# Patient Record
Sex: MALE | Race: WHITE
[De-identification: names, ages, dates, MRNs, and addresses within clinical notes are randomized per-mention and may not be internally consistent; named-entity substitution may affect disease eponyms.]

---

## 2020-12-13 ENCOUNTER — HOSPITAL ENCOUNTER (EMERGENCY)
Dept: HOSPITAL 75 - ER FS | Age: 65
Discharge: HOME | End: 2020-12-13
Payer: MEDICARE

## 2020-12-13 VITALS — DIASTOLIC BLOOD PRESSURE: 77 MMHG | SYSTOLIC BLOOD PRESSURE: 139 MMHG

## 2020-12-13 VITALS — WEIGHT: 129.85 LBS | BODY MASS INDEX: 19.68 KG/M2 | HEIGHT: 67.99 IN

## 2020-12-13 DIAGNOSIS — R04.2: Primary | ICD-10-CM

## 2020-12-13 DIAGNOSIS — F17.210: ICD-10-CM

## 2020-12-13 DIAGNOSIS — I25.2: ICD-10-CM

## 2020-12-13 PROCEDURE — 99283 EMERGENCY DEPT VISIT LOW MDM: CPT

## 2020-12-13 NOTE — ED COUGH/URI
General


Chief Complaint:  Coughing Up Blood


Stated Complaint:  COUGHING UP BLOOD


Nursing Triage Note:  


Patient reports he has been coughing up bright red blood at least three times 


per day for several days. He states he has a history of COPD and this happens 


every year. He states he is usually placed on antibiotics and the blood in his 


sputum is resolved after several days. He reports he has a pulmonologist at 


[facility] and gets yearly CT scans, but missed his scan this year in




August d/t COVID. Patient actively coughing up moderate amount of bright red 


blood on arrival to ED.


Sepsis Screen:  No Definite Risk


Source:  patient





History of Present Illness


Date Seen by Provider:  Dec 13, 2020


Time Seen by Provider:  18:44


Initial Comments


patient is a 65-year-old male presenting with complaints of cough and 

hemoptysis. He states that this happens usually at least once a year. He has a 

history of COPD and infection in his lungs. He was told that he has a chronic 

infection in his lungs and requires antibiotics periodically. He follows with a 

pulmonary doctor out of [facility]. He started having intermittent 

hemoptysis on Friday but it was worse tonight. He has not had a CT scan of his 

chest this year like he normally does every year. He states that his significant

other had made him come in [facility] to get checked out. She is also trying to get

him an appointment to see Dr. Rayo in the morning. he denies having any fever

or chills. He does not feel any more short of breath than normal. He does have 

some times that he'll cough up regular sputum but today he has had more bloody 

sputum.





Allergies and Home Medications


Allergies


Coded Allergies:  


     No Known Drug Allergies (Unverified , 12/13/20)





Home Medications


Levofloxacin 750 Mg Tablet, 750 MG PO DAILY


   Prescribed by: LEAH MC on 12/13/20 1924





Patient Home Medication List


Home Medication List Reviewed:  Yes





Review of Systems


Review of Systems


Constitutional:  No chills, No dizziness, No fever, No malaise, No weakness


EENTM:  No epistaxis, No nose congestion, No throat pain, No throat swelling


Respiratory:  see HPI, cough, hemoptysis; No orthopnea; short of breath (chronic

and not worse than normal); No stridor; wheezing


Cardiovascular:  No chest pain, No edema, No palpitations


Gastrointestinal:  no symptoms reported; No nausea, No vomiting


Genitourinary:  no symptoms reported


Musculoskeletal:  no symptoms reported


Skin:  no symptoms reported


Psychiatric/Neurological:  Denies Headache, Denies Numbness, Denies Paresthesia,

Denies Weakness


Hematologic/Lymphatic:  Easy Bruising





Past Medical-Social-Family Hx


Past Med/Social Hx:  Reviewed Nursing Past Med/Soc Hx


Patient Social History


Alcohol Use:  Regular Use


Alcohol Beverage of Choice:  Beer


Recreational Drug Use:  No


Smoking Status:  Current Everyday Smoker


Type Used:  Cigarettes


2nd Hand Smoke Exposure:  No


Recent Foreign Travel:  No


Contact w/Someone Who Travel:  No


Recent Infectious Disease Expo:  No


Recent Hopitalizations:  No


Physical Abuse:  No


Sexual Abuse:  No


Mistreated:  No


Fear:  No





Seasonal Allergies


Seasonal Allergies:  No





Past Medical History


Surgeries:  Yes (colon surgery)


Respiratory:  Yes ("something slowly growing in my lungs")


COPD


Cardiac:  Yes


Heart Attack


Neurological:  No


Genitourinary:  No


Gastrointestinal:  No


Musculoskeletal:  No


Endocrine:  No


HEENT:  No


Cancer:  No


Psychosocial:  No


Integumentary:  No





Physical Exam





Vital Signs - First Documented








 12/13/20





 18:06


 


Temp 37.1


 


Pulse 80


 


Resp 18


 


B/P (MAP) 158/80 (106)


 


Pulse Ox 95


 


O2 Delivery Room Air





Capillary Refill : Less Than 3 Seconds


Height: '"


Weight: lbs. oz. kg; 19.00 BMI


Method:


General Appearance:  no apparent distress, thin


HEENT:  PERRL/EOMI


Neck:  non-tender, full range of motion, supple, normal inspection


Respiratory:  chest non-tender, no respiratory distress, no accessory muscle 

use, decreased breath sounds, wheezing (inspiratory and expiratory wheezing)


Cardiovascular:  normal peripheral pulses, regular rate, rhythm


Extremities:  normal range of motion, non-tender, no pedal edema, normal 

capillary refill


Neurologic/Psychiatric:  CNs II-XII nml as tested, no motor/sensory deficits, 

alert, normal mood/affect, oriented x 3


Skin:  normal color, warm/dry





Progress/Results/Core Measures


Suspected Sepsis


Recent Fever Within 48 Hours:  No


Infection Criteria Present:  Suspected New Infection


New/Unexplained  Altered Menta:  No


Sepsis Screen:  No Definite Risk


SIRS


Temperature: 


Pulse: 80 


Respiratory Rate: 18


 


Blood Pressure 158 /80 


Mean: 106





Results/Orders


My Orders





Orders - LEAH MC MD


Levofloxacin  Tablet (Levaquin  Tablet) (12/13/20 19:20)





Vital Signs/I&O











 12/13/20 12/13/20





 18:06 18:21


 


Temp 37.1 


 


Pulse 80 


 


Resp 18 


 


B/P (MAP) 158/80 (106) 


 


Pulse Ox 95 


 


O2 Delivery Room Air Room Air





Capillary Refill : Less Than 3 Seconds








Blood Pressure Mean:                    106








Progress Note :  


Progress Note


advised patient that I could do a CT scan tonight as well as check basic labs 

and give him some IV antibiotics to help treat for possible infection. However 

the patient stated that he was used to coughing up blood was not that worried 

about it. He came in because he had been told previously to get checked if he 

was coughing up blood. His O2 sat is running 95-96% on room air. He is able to 

speak in complete sentences without getting winded. He states he would like to 

discuss antibiotics and follow up with Dr. Rayo in the am. He does not want 

to wait and take the time to do the testing tonight and would want to wait and 

test with Dr. Rayo tomorrow if possible instead. I told him I could not force

him to do the CT and testing tonight. He said he wanted to go get something to 

eat and be with his significant other so he was going to leave but would like 

the antibiotic to get started tonight by pills if possible. 


start with levaquin and discharge with script to the pharmacy. Counseled to 

return or be seen right away if he has worsening breathing problems. Keep appoin

tment in the morning with Dr. Rayo





Departure


Impression





   Primary Impression:  


   Hemoptysis


Disposition:  01 HOME, SELF-CARE


Condition:  Stable





Departure-Patient Inst.


Decision time for Depature:  19:24


Referrals:  


ALISSON RAYO MD (PCP/Family)


Primary Care Physician


Patient Instructions:  Coughing up Blood





Add. Discharge Instructions:  


Take Levaquin for possible infection causing you to be coughing up blood.





If this worsens then check with your lung doctor or return. See Dr. Rayo as 

you plan tomorrow for follow up.





Try using a humidifier at the bedside to help keep your airways moist when you 

are sleeping. This should also cut down on the blood and coughing.





All discharge instructions reviewed with patient and/or family. Voiced 

understanding.


Scripts


Levofloxacin (Levofloxacin) 750 Mg Tablet


750 MG PO DAILY for hemoptysis for 10 Days, #10 TAB 0 Refills


   Prov: LEAH MC MD         12/13/20











LEAH MC MD               Dec 13, 2020 19:24

## 2020-12-17 ENCOUNTER — HOSPITAL ENCOUNTER (OUTPATIENT)
Dept: HOSPITAL 75 - RAD FS | Age: 65
End: 2020-12-17
Attending: FAMILY MEDICINE
Payer: MEDICARE

## 2020-12-17 DIAGNOSIS — J43.2: Primary | ICD-10-CM

## 2020-12-17 LAB
ALBUMIN SERPL-MCNC: 3.9 GM/DL (ref 3.2–4.5)
ALP SERPL-CCNC: 90 U/L (ref 40–136)
ALT SERPL-CCNC: 19 U/L (ref 0–55)
BILIRUB SERPL-MCNC: 0.4 MG/DL (ref 0.1–1)
BUN/CREAT SERPL: 19
CALCIUM SERPL-MCNC: 9.3 MG/DL (ref 8.5–10.1)
CHLORIDE SERPL-SCNC: 97 MMOL/L (ref 98–107)
CO2 SERPL-SCNC: 30 MMOL/L (ref 21–32)
CREAT SERPL-MCNC: 0.67 MG/DL (ref 0.6–1.3)
GFR SERPLBLD BASED ON 1.73 SQ M-ARVRAT: > 60 ML/MIN
GLUCOSE SERPL-MCNC: 96 MG/DL (ref 70–105)
POTASSIUM SERPL-SCNC: 4.1 MMOL/L (ref 3.6–5)
PROT SERPL-MCNC: 6.9 GM/DL (ref 6.4–8.2)
SODIUM SERPL-SCNC: 136 MMOL/L (ref 135–145)

## 2020-12-17 PROCEDURE — 80053 COMPREHEN METABOLIC PANEL: CPT

## 2020-12-17 PROCEDURE — 71260 CT THORAX DX C+: CPT

## 2020-12-17 PROCEDURE — 36415 COLL VENOUS BLD VENIPUNCTURE: CPT

## 2020-12-17 NOTE — DIAGNOSTIC IMAGING REPORT
EXAMINATION: CT Chest with intravenous contrast.



TECHNIQUE: Multiple contiguous axial images were obtained through

the chest after the uneventful administration of intravenous

contrast. All CT scans use one or more of the following dose

optimizing techniques: automated exposure control, MA and/or KvP

adjustment based on a patient size and exam type, or iterative

reconstruction. 



HISTORY: Emphysema, hemoptysis.



COMPARISON: None available.



FINDINGS: 



There is upper lobe predominant emphysema. There is bronchial

wall thickening and cavity formation in the upper lobes. There

are scattered areas of mucus plugging with centrilobular and

tree-in-bud nodules. In the left lower lobe, there is a more

discrete 2.4 x 2.1 cm part solid and groundglass nodule. There

are scattered areas of bronchiectasis as well. No pleural

effusion. No pneumothorax.



There is no axillary or supraclavicular lymphadenopathy. There

are a few mildly enlarged mediastinal lymph nodes measuring up to

11 mm. Heart size is normal. There are no coronary artery

calcifications.  No pericardial effusion. Aorta is normal in

caliber.



Limited views of the upper abdomen show multiple cysts in the

liver.



There are no suspicious osseous lesions.



IMPRESSION:



1. Upper lobe predominant emphysema with areas of cavitation,

scarring, bronchiectasis and bronchial wall thickening. This

findings are suggestive of chronic and bronchial infection

including mycobacterial infections.



2. Superimposed upon this, there are left lower lobe predominant

centrilobular groundglass and tree-in-bud nodules consistent with

an acute superimposed infection. A discrete fairly large part

solid nodule in left lower lobe is favored to be infectious, but

short-term follow-up in four to six weeks is recommended to

ensure this is not a neoplasm.



Dictated by: 



  Dictated on workstation # [id number]

## 2021-04-26 ENCOUNTER — HOSPITAL ENCOUNTER (OUTPATIENT)
Dept: HOSPITAL 75 - RAD FS | Age: 66
End: 2021-04-26
Payer: MEDICARE

## 2021-04-26 DIAGNOSIS — R91.1: ICD-10-CM

## 2021-04-26 DIAGNOSIS — R04.2: Primary | ICD-10-CM

## 2021-04-26 PROCEDURE — 71250 CT THORAX DX C-: CPT

## 2021-04-26 NOTE — DIAGNOSTIC IMAGING REPORT
EXAMINATION: CT chest without contrast.



TECHNIQUE: Multiple contiguous axial images were obtained through

the chest without the use of intravenous contrast. All CT scans

use one or more of the following dose optimizing techniques:

automated exposure control, MA and/or KvP adjustment based on

patient size and exam type or iterative reconstruction. 



HISTORY: Hemoptysis.



COMPARISON: 12/17/2020



FINDINGS: 



Again seen is upper lobe predominant emphysema with scarring,

traction bronchiectasis, mucous plugging and bronchial wall

thickening. There is also involvement of the lingula. There are

tree-in-bud nodules and centrilobular nodules in the left lower

lobe. The suspicious part solid nodule seen on the prior exam is

now cavitated consistent with an infection. The overall size of

the abnormality is 1.9 x 1.3 cm, previously 2.4 x 1.7 cm. No

pleural effusion. No pneumothorax.



There is no axillary or supraclavicular lymphadenopathy. There is

no mediastinal lymphadenopathy. Heart size is normal. There are

mild coronary artery calcifications.  No pericardial effusion.

Aorta is normal in caliber.



Limited views of the upper abdomen show cysts in the liver.



There are no suspicious osseous lesions.



IMPRESSION:



1. The superior segment left lower lobe nodule on the prior exam

is now cavitated and decreased in size consistent with evolving

infectious process. Overall, the appearance of the lungs similar

to prior study and most suggestive of a chronic endobronchial

infection, with particular suspicion for mycobacterial infection.



Dictated by: 



  Dictated on workstation # [id number]

## 2021-07-22 ENCOUNTER — HOSPITAL ENCOUNTER (OUTPATIENT)
Dept: HOSPITAL 75 - PREOP | Age: 66
Discharge: HOME | End: 2021-07-22
Attending: SURGERY
Payer: MEDICARE

## 2021-07-22 VITALS — WEIGHT: 112.44 LBS | HEIGHT: 69.02 IN | BODY MASS INDEX: 16.65 KG/M2

## 2021-07-22 DIAGNOSIS — Z01.818: Primary | ICD-10-CM

## 2021-07-29 ENCOUNTER — HOSPITAL ENCOUNTER (OUTPATIENT)
Dept: HOSPITAL 75 - SDC | Age: 66
Discharge: HOME | End: 2021-07-29
Attending: SURGERY
Payer: MEDICARE

## 2021-07-29 VITALS — HEIGHT: 69.02 IN | WEIGHT: 112.44 LBS | BODY MASS INDEX: 16.65 KG/M2

## 2021-07-29 VITALS — DIASTOLIC BLOOD PRESSURE: 56 MMHG | SYSTOLIC BLOOD PRESSURE: 108 MMHG

## 2021-07-29 VITALS — DIASTOLIC BLOOD PRESSURE: 57 MMHG | SYSTOLIC BLOOD PRESSURE: 109 MMHG

## 2021-07-29 VITALS — SYSTOLIC BLOOD PRESSURE: 120 MMHG | DIASTOLIC BLOOD PRESSURE: 66 MMHG

## 2021-07-29 VITALS — DIASTOLIC BLOOD PRESSURE: 69 MMHG | SYSTOLIC BLOOD PRESSURE: 132 MMHG

## 2021-07-29 VITALS — SYSTOLIC BLOOD PRESSURE: 128 MMHG | DIASTOLIC BLOOD PRESSURE: 69 MMHG

## 2021-07-29 VITALS — DIASTOLIC BLOOD PRESSURE: 64 MMHG | SYSTOLIC BLOOD PRESSURE: 129 MMHG

## 2021-07-29 VITALS — DIASTOLIC BLOOD PRESSURE: 65 MMHG | SYSTOLIC BLOOD PRESSURE: 128 MMHG

## 2021-07-29 VITALS — DIASTOLIC BLOOD PRESSURE: 64 MMHG | SYSTOLIC BLOOD PRESSURE: 110 MMHG

## 2021-07-29 VITALS — SYSTOLIC BLOOD PRESSURE: 129 MMHG | DIASTOLIC BLOOD PRESSURE: 80 MMHG

## 2021-07-29 DIAGNOSIS — J43.9: ICD-10-CM

## 2021-07-29 DIAGNOSIS — Z83.3: ICD-10-CM

## 2021-07-29 DIAGNOSIS — F17.210: ICD-10-CM

## 2021-07-29 DIAGNOSIS — Z82.49: ICD-10-CM

## 2021-07-29 DIAGNOSIS — K40.90: Primary | ICD-10-CM

## 2021-07-29 DIAGNOSIS — Z79.82: ICD-10-CM

## 2021-07-29 DIAGNOSIS — Z80.9: ICD-10-CM

## 2021-07-29 DIAGNOSIS — Z79.899: ICD-10-CM

## 2021-07-29 PROCEDURE — 87081 CULTURE SCREEN ONLY: CPT

## 2021-07-29 PROCEDURE — 88302 TISSUE EXAM BY PATHOLOGIST: CPT

## 2021-07-29 PROCEDURE — 49505 PRP I/HERN INIT REDUC >5 YR: CPT

## 2021-07-29 NOTE — DISCHARGE INST-SIMPLE/STANDARD
Discharge Inst-Standard


Discharge Medications


New, Converted or Re-Newed RX:  Transmitted to Pharmacy





Patient Instructions/Follow Up


Plan of Care/Instructions/FU:  


Anamaria 2-3 weeks.


Activity as Tolerated:  No


Discharge Diet:  Regular Diet


Other Inst to Patient


PROCEDURE:


Open left inguinal hernia repair





COMPLICATIONS:


None.





INDICATIONS:


The patient is a 65, male male with a inguinal hernia.  He understands


risks and benefits of procedure and wished to proceed with procedure.  Consent


was signed in the chart.





DESCRIPTION OF PROCEDURE:


The patient was taken to the operating suite, was prepped and draped in sterile


fashion.  Surgical pause was performed.  Local anesthetic was infiltrated in the


lower quadrant.  Incision was made and cautery used to dissect down to the


external oblique, which was then opened down through the external ring.  The


spermatic cord was then dissected around.  A Penrose drain was placed around it


and there was no direct defect present.  A indirect hernia present


which was then dissected off of spermatic cord.  The hernia sac was then twisted


and suture ligated.  Using ProGrip mesh, this was secured at Paddy's ligament 

and then


incorporated around the spermatic cord and placed under the external oblique. 


Hemostasis was achieved.  The external oblique was then closed recreating the


external ring and then the subcutaneous tissues were then reapproximated using


3-0 Vicryl and skin was then closed using 4-0 Monocryl in a subcuticular


fashion.  The abdomen was then washed and dried and Skin Affix was placed over


the incision.  The patient tolerated procedure well without any complications


and taken to recovery room in stable condition.











JOHN CALZADA DO              Jul 29, 2021 08:48

## 2021-07-29 NOTE — ANESTHESIA-GENERAL POST-OP
General


Patient Condition


Mental Status/LOC:  Same as Preop


Cardiovascular:  Satisfactory


Nausea/Vomiting:  Absent


Respiratory:  Satisfactory


Pain:  Controlled


Complications:  Absent





Post Op Complications


Complications


None





Follow Up Care/Instructions


Patient Instructions


None needed.





Anesthesia/Patient Condition


Patient Condition


Patient is doing well, no complaints, stable vital signs, no apparent adverse 

anesthesia problems.   


No complications reported per nursing.











FAHAD MCBRIDE CRNA         Jul 29, 2021 11:31

## 2021-07-29 NOTE — PROGRESS NOTE-PRE OPERATIVE
Pre-Operative Progress Note


H&P Reviewed


The H&P was reviewed, patient examined and no changes noted.


Date Seen by Provider:  Jul 29, 2021


Time Seen by Provider:  07:55


Date H&P Reviewed:  Jul 29, 2021


Time H&P Reviewed:  07:55


Pre-Operative Diagnosis:  left inguinal hernia











JOHN CALZADA DO              Jul 29, 2021 08:06

## 2021-08-16 NOTE — PROGRESS NOTE-POST OPERATIVE
Post-Operative Progess Note


Surgeon (s)/Assistant (s)


Surgeon


JOHN CALZADA DO


Assistant:  Dr. Amos to assist in retraction dissection and closure.





Pre-Operative Diagnosis


left inguinal hernia





Post-Operative Diagnosis





left indirect inguinal hernia





Procedure & Operative Findings


Date of Procedure


7/29/21


Procedure Performed/Findings


PROCEDURE:


Open left inguinal hernia repair





COMPLICATIONS:


None.





INDICATIONS:


The patient is a 65, male male with a inguinal hernia.  He understands


risks and benefits of procedure and wished to proceed with procedure.  Consent


was signed in the chart.





DESCRIPTION OF PROCEDURE:


The patient was taken to the operating suite, was prepped and draped in sterile


fashion.  Surgical pause was performed.  Local anesthetic was infiltrated in the


lower quadrant.  Incision was made and cautery used to dissect down to the


external oblique, which was then opened down through the external ring.  The


spermatic cord was then dissected around.  A Penrose drain was placed around it


and there was no direct defect present.  A indirect hernia present


which was then dissected off of spermatic cord.  The hernia sac was then twisted


and suture ligated.  Using ProGrip mesh, this was secured at Paddy's ligament 

and then


incorporated around the spermatic cord and placed under the external oblique. 


Hemostasis was achieved.  The external oblique was then closed recreating the


external ring and then the subcutaneous tissues were then reapproximated using


3-0 Vicryl and skin was then closed using 4-0 Monocryl in a subcuticular


fashion.  The abdomen was then washed and dried and Skin Affix was placed over


the incision.  The patient tolerated procedure well without any complications


and taken to recovery room in stable condition.


Anesthesia Type


general





Estimated Blood Loss


Estimated blood loss (mL):  minimal





Specimens/Packing


Specimens Removed


left hernia sac











JOHN CALZADA DO              Aug 16, 2021 10:53

## 2021-09-01 ENCOUNTER — HOSPITAL ENCOUNTER (OUTPATIENT)
Dept: HOSPITAL 75 - RAD FS | Age: 66
End: 2021-09-01
Attending: INTERNAL MEDICINE
Payer: MEDICARE

## 2021-09-01 DIAGNOSIS — R91.8: Primary | ICD-10-CM

## 2021-09-01 DIAGNOSIS — F17.210: ICD-10-CM

## 2021-09-01 PROCEDURE — 71250 CT THORAX DX C-: CPT

## 2021-09-01 NOTE — DIAGNOSTIC IMAGING REPORT
EXAMINATION: CT chest without contrast.



TECHNIQUE: Multiple contiguous axial images were obtained through

the chest without the use of intravenous contrast. All CT scans

use one or more of the following dose optimizing techniques:

automated exposure control, MA and/or KvP adjustment based on

patient size and exam type or iterative reconstruction. 



HISTORY: Pulmonary nodule.



COMPARISON: 04/26/2021



FINDINGS: 



There is moderate upper lobe predominant emphysema. There is

multifocal mucous plugging and bronchial dilation and bronchial

wall thickening. There are tree-in-bud nodules in the left upper

lobe and right upper lobe. Cavitary nodule in the superior

segment of left lower lobe is unchanged. There is a large area of

cavitation in the apex of the left lung. There is scarring in the

lateral portion of the right apex is unchanged. There is a new

2.8 x 1.9 cm nodule in the lingula. No pleural effusion. No

pneumothorax.



There is no axillary or supraclavicular lymphadenopathy. There is

no mediastinal lymphadenopathy. Heart size is normal. There are

mild coronary artery calcifications.  No pericardial effusion.

Aorta is normal in caliber.



Limited views of the upper abdomen show cysts in the liver.



There are no suspicious osseous lesions.



IMPRESSION:



1. Persistent bronchial wall thickening, mucous plugging,

tree-in-bud nodules, bronchial dilation with multiple cavitary

nodules. There is a new 2.8 x 1.9 cm lingular nodule. Overall

findings are favored to represent chronic endobronchial infection

including mycobacterial infections. Six week follow-up is

recommended for the lingular nodule.



Dictated by: 



  Dictated on workstation # [id number]

## 2021-10-11 ENCOUNTER — HOSPITAL ENCOUNTER (OUTPATIENT)
Dept: HOSPITAL 75 - RAD FS | Age: 66
End: 2021-10-11
Attending: INTERNAL MEDICINE
Payer: MEDICARE

## 2021-10-11 DIAGNOSIS — F17.210: ICD-10-CM

## 2021-10-11 DIAGNOSIS — R91.8: Primary | ICD-10-CM

## 2021-10-11 DIAGNOSIS — J85.2: ICD-10-CM

## 2021-10-11 PROCEDURE — 71250 CT THORAX DX C-: CPT

## 2021-10-11 NOTE — DIAGNOSTIC IMAGING REPORT
EXAMINATION: CT chest without contrast.



TECHNIQUE: Multiple contiguous axial images were obtained through

the chest without the use of intravenous contrast. All CT scans

use one or more of the following dose optimizing techniques:

automated exposure control, MA and/or KvP adjustment based on

patient size and exam type or iterative reconstruction. 



HISTORY: Pulmonary nodule



COMPARISON: 09/01/2021



FINDINGS: 



There is unchanged bronchial wall thickening and mucous plugging

and tree-in-bud nodules most pronounced in the lingula. There are

areas of cavitation and scarring in both upper lobes. Lungs are

severely emphysematous. The discrete lingular nodule measures 2.2

x 1.5 cm previously 2.8 x 1.9 cm. No new nodules are seen. No

pleural effusion. No pneumothorax.



There is no axillary or supraclavicular lymphadenopathy. There is

no mediastinal lymphadenopathy. Heart size is normal. There are

mild coronary artery calcifications.  No pericardial effusion.

Aorta is normal in caliber.



Limited views of the upper abdomen show a cyst in the liver.



There are no suspicious osseus lesions.



IMPRESSION:



1. Persistent bronchial wall thickening, tree-in-bud nodules and

mucus plugging suggestive of chronic endobronchial infection

including mycobacterial infections. The most discrete lingular

nodule is decreased in size. Continued imaging followup is

warranted but this is all favored to be due to a chronic

infection.



Dictated by: 



  Dictated on workstation # [id number]

## 2022-10-12 ENCOUNTER — HOSPITAL ENCOUNTER (OUTPATIENT)
Dept: HOSPITAL 75 - RAD FS | Age: 67
End: 2022-10-12
Attending: INTERNAL MEDICINE
Payer: MEDICARE

## 2022-10-12 DIAGNOSIS — I77.811: ICD-10-CM

## 2022-10-12 DIAGNOSIS — R91.8: Primary | ICD-10-CM

## 2022-10-12 PROCEDURE — 71250 CT THORAX DX C-: CPT

## 2022-10-12 NOTE — DIAGNOSTIC IMAGING REPORT
PROCEDURE: 

CT chest without contrast.



TECHNIQUE: 

Multiple contiguous axial images were obtained through the chest

without the use of intravenous contrast. Auto Exposure Controls

were utilized during the CT exam to meet ALARA standards for

radiation dose reduction. 



INDICATION:  

R91.8, pulmonary nodules.



COMPARISON: 

10/11/2021 and 12/17/2020.



FINDINGS: 

No significant adenopathy within the chest. Mild scattered

vascular calcifications without aneurysmal dilatation of the

thoracic aorta. The heart is within normal limits in size. No

significant pericardial effusion. No pleural effusion. No

pneumothorax. Severe background emphysematous changes,

particularly within the upper lobes. Persistent areas of scarring

and cavitation are again noted within the upper lobes. New

tree-in-bud nodularity is noted within the medial aspect of the

left lower lobe with the most focal pulmonary nodule within this

location measuring up to 0.8 cm, series 5, image 81. The

previously noted lingular pulmonary nodule has decreased in size

since the prior examination measuring up to 1.6 cm, previously

measuring up to 2.4 cm. Additional areas of bronchial wall

thickening, mucous plugging, and tree-in-bud nodules are again

identified appearing similar to the prior examination. Hepatic

cysts. Ectasia with advanced calcifications within the abdominal

aorta measuring up to 2.5 cm. No acute osseous abnormality.



IMPRESSION: 

New tree-in-bud nodularity with a focal nodule measuring up to

0.8 cm. Given appearance, this is felt to most likely relate to

an endobronchial infectious or inflammatory process. Followup CT

in 3 months is recommended to reevaluate as malignancy is not

excluded.



Additional stable bronchial wall thickening, tree-in-bud

nodularity, and mucous plugging which suggest an underlying

chronic endobronchial infection such as a mycobacterial

infection. This is associated with prominent cavitation within

the upper lungs.



Ectasia of the abdominal aorta with associated advanced vascular

calcifications.



Dictated by: 



  Dictated on workstation # [id number]

## 2022-12-06 ENCOUNTER — HOSPITAL ENCOUNTER (OUTPATIENT)
Dept: HOSPITAL 75 - PREOP | Age: 67
LOS: 2 days | Discharge: HOME | End: 2022-12-08
Attending: SURGERY
Payer: MEDICARE

## 2022-12-06 VITALS — WEIGHT: 108.03 LBS | HEIGHT: 69.02 IN | BODY MASS INDEX: 16 KG/M2

## 2022-12-06 DIAGNOSIS — Z01.818: Primary | ICD-10-CM

## 2022-12-13 ENCOUNTER — HOSPITAL ENCOUNTER (OUTPATIENT)
Dept: HOSPITAL 75 - ENDO | Age: 67
Discharge: HOME | End: 2022-12-13
Attending: SURGERY
Payer: MEDICARE

## 2022-12-13 VITALS — SYSTOLIC BLOOD PRESSURE: 146 MMHG | DIASTOLIC BLOOD PRESSURE: 77 MMHG

## 2022-12-13 VITALS — WEIGHT: 108.03 LBS | HEIGHT: 69.02 IN | BODY MASS INDEX: 16 KG/M2

## 2022-12-13 VITALS — DIASTOLIC BLOOD PRESSURE: 50 MMHG | SYSTOLIC BLOOD PRESSURE: 93 MMHG

## 2022-12-13 VITALS — DIASTOLIC BLOOD PRESSURE: 77 MMHG | SYSTOLIC BLOOD PRESSURE: 134 MMHG

## 2022-12-13 VITALS — SYSTOLIC BLOOD PRESSURE: 101 MMHG | DIASTOLIC BLOOD PRESSURE: 59 MMHG

## 2022-12-13 VITALS — SYSTOLIC BLOOD PRESSURE: 95 MMHG | DIASTOLIC BLOOD PRESSURE: 57 MMHG

## 2022-12-13 DIAGNOSIS — Z86.010: Primary | ICD-10-CM

## 2022-12-13 NOTE — ANESTHESIA-GENERAL POST-OP
MAC


Patient Condition


Mental Status/LOC:  Same as Preop


Cardiovascular:  Satisfactory


Nausea/Vomiting:  Absent


Respiratory:  Satisfactory


Pain:  Controlled


Complications:  Absent





Post Op Complications


Complications


None





Follow Up Care/Instructions


Patient Instructions


None needed.





Anesthesiology Discharge Order


Discharge Order


Patient is doing well, no complaints, stable vital signs, no apparent adverse 

anesthesia problems.   


No complications reported per nursing.











ROSALIA GARCIA CRNA            Dec 13, 2022 09:33

## 2022-12-13 NOTE — ANESTHESIA-GENERAL POST-OP
MAC


Patient Condition


Mental Status/LOC:  Same as Preop


Cardiovascular:  Satisfactory


Nausea/Vomiting:  Absent


Respiratory:  Satisfactory


Pain:  Controlled


Complications:  Absent





Post Op Complications


Complications


None





Follow Up Care/Instructions


Patient Instructions


None needed.





Anesthesiology Discharge Order


Discharge Order


Patient is doing well, no complaints, stable vital signs, no apparent adverse 

anesthesia problems.   


No complications reported per nursing.











ROSALIA GARCIA CRNA            Dec 13, 2022 10:17

## 2022-12-13 NOTE — DISCHARGE INST-SIMPLE/STANDARD
Discharge Inst-Standard


Patient Instructions/Follow Up


Plan of Care/Instructions/FU:  


Follow up with Dr. Conrad in two weeks


Activity as Tolerated:  Yes


Discharge Diet:  Liquid Diet (Clears)











JOHN CONRAD DO              Dec 13, 2022 09:31

## 2022-12-13 NOTE — OPERATIVE REPORT
DATE OF SERVICE: 12/13/2022



PREOPERATIVE DIAGNOSIS:

History of polyps.



POSTOPERATIVE DIAGNOSIS:

Incomplete colonoscopy.



PROCEDURES:

Flexible sigmoidoscopy, incomplete colonoscopy.



ASSISTANT:

John Conrad DO



ANESTHESIA:

Per CRNA.



ESTIMATED BLOOD LOSS:

None.



COMPLICATIONS:

None.



INDICATIONS:

The patient is a 67-year-old male with history of colon polyps.  He understands 

risks and benefits of procedure and wishes to proceed.  Consent was signed in 

the chart.



DESCRIPTION OF PROCEDURE:

The patient was taken to endoscopy suite, placed in left lateral recumbent 

position.  A timeout was performed.  Digital rectal exam was performed.  No 

palpable polyps, masses or ulcerations.  Scope was inserted in the rectum and 

advanced through the first portion of the sigmoid, very redundant area and 

difficult to maneuver.  The patient was repositioned multiple times and the 

scope could no longer be advanced.  The scope was withdrawn and then reinserted 

and then again going through the rectum into the sigmoid colon also being 

repositioned again without any successful advancement of the scope.  Scope was 

then slowly retracted back until completely removed.  No gross pathology noted. 

Would recommend a CT scan of the abdomen and pelvis with rectal contrast for 

further evaluation.  This will be set up and the patient will follow up in 2 

weeks.





Job ID: [id number]

DocumentID: [id number]

Dictated Date: 12/13/2022 09:40:55

Transcription Date: 12/13/2022 13:58:00

Dictated By: JOHN CONRAD DO

## 2022-12-14 ENCOUNTER — HOSPITAL ENCOUNTER (OUTPATIENT)
Dept: HOSPITAL 75 - RAD FS | Age: 67
End: 2022-12-14
Attending: SURGERY
Payer: MEDICARE

## 2022-12-14 DIAGNOSIS — K63.89: ICD-10-CM

## 2022-12-14 DIAGNOSIS — K76.89: Primary | ICD-10-CM

## 2022-12-14 LAB
BUN/CREAT SERPL: 21
CREAT SERPL-MCNC: 0.62 MG/DL (ref 0.6–1.3)
GFR SERPLBLD BASED ON 1.73 SQ M-ARVRAT: 103 ML/MIN

## 2022-12-14 PROCEDURE — 36415 COLL VENOUS BLD VENIPUNCTURE: CPT

## 2022-12-14 PROCEDURE — 74177 CT ABD & PELVIS W/CONTRAST: CPT

## 2022-12-14 PROCEDURE — 84520 ASSAY OF UREA NITROGEN: CPT

## 2022-12-14 PROCEDURE — 82565 ASSAY OF CREATININE: CPT

## 2022-12-14 NOTE — DIAGNOSTIC IMAGING REPORT
PROCEDURE: CT abdomen and pelvis with contrast.



TECHNIQUE: Multiple contiguous axial images were obtained through

the abdomen and pelvis after administration of intravenous

contrast. Auto Exposure Controls were utilized during the CT exam

to meet ALARA standards for radiation dose reduction. All CT

scans use one or more of the following dose optimizing

techniques: automated exposure control, MA and/or KvP adjustment

based on patient size and exam type or iterative reconstruction.



INDICATION:  Abnormal colonoscopy.



No prior studies are available for comparison.



The lung bases are clear. There are low-attenuation lesions

within the liver, largest left lobe measuring 2.9 cm and most

consistent with hepatic cysts. Gallbladder is unremarkable. There

is no biliary ductal dilatation. Pancreas and spleen are

unremarkable. No adrenal mass is detected. Kidneys are

unremarkable. Aorta and iliac vessels are heavily calcified but

nonaneurysmal. Diffuse colonic contrast is present. No discrete

colonic mass is identified. There does appear to be some mild

wall thickening in the sigmoid but no discrete mass is seen.

Small bowel loops are normal caliber. There is  no free fluid or

fluid collection identified. No abdominal or pelvic

lymphadenopathy is seen. The bladder is decompressed. Prostate is

unremarkable. Bony structures are nonacute.



IMPRESSION:

1. Hepatic cysts.

2. There is some mild wall thickening involving the sigmoid

colon, nonspecific. No discrete mass is identified. Correlation

with endoscopic findings is recommended. The study is otherwise

unremarkable.



Dictated by: 



  Dictated on workstation # [id number]

## 2023-01-10 ENCOUNTER — HOSPITAL ENCOUNTER (OUTPATIENT)
Dept: HOSPITAL 75 - PREOP | Age: 68
Discharge: HOME | End: 2023-01-10
Attending: SURGERY
Payer: MEDICARE

## 2023-01-10 VITALS — HEIGHT: 69.02 IN | WEIGHT: 105.82 LBS | BODY MASS INDEX: 15.67 KG/M2

## 2023-01-10 DIAGNOSIS — Z01.818: Primary | ICD-10-CM

## 2023-01-17 ENCOUNTER — HOSPITAL ENCOUNTER (OUTPATIENT)
Dept: HOSPITAL 75 - ENDO | Age: 68
Discharge: HOME | End: 2023-01-17
Attending: SURGERY
Payer: MEDICARE

## 2023-01-17 VITALS — SYSTOLIC BLOOD PRESSURE: 106 MMHG | DIASTOLIC BLOOD PRESSURE: 55 MMHG

## 2023-01-17 VITALS — SYSTOLIC BLOOD PRESSURE: 123 MMHG | DIASTOLIC BLOOD PRESSURE: 73 MMHG

## 2023-01-17 VITALS — DIASTOLIC BLOOD PRESSURE: 56 MMHG | SYSTOLIC BLOOD PRESSURE: 94 MMHG

## 2023-01-17 VITALS — SYSTOLIC BLOOD PRESSURE: 96 MMHG | DIASTOLIC BLOOD PRESSURE: 55 MMHG

## 2023-01-17 VITALS — HEIGHT: 68.9 IN | WEIGHT: 105.82 LBS | BODY MASS INDEX: 15.67 KG/M2

## 2023-01-17 VITALS — SYSTOLIC BLOOD PRESSURE: 91 MMHG | DIASTOLIC BLOOD PRESSURE: 54 MMHG

## 2023-01-17 DIAGNOSIS — K57.30: Primary | ICD-10-CM

## 2023-01-17 DIAGNOSIS — Z86.010: ICD-10-CM

## 2023-01-17 DIAGNOSIS — F17.210: ICD-10-CM

## 2023-01-17 NOTE — PROGRESS NOTE-PRE OPERATIVE
Pre-Operative Progress Note


Date of Available H&P:  Dec 28, 2022


Date H&P Reviewed:  Jan 17, 2023


Time H&P Reviewed:  12:06


History & Physical:  H&P Reviewed, Patient Examed, No changes noted


Pre-Operative Diagnosis:  Abnormal CT scan











JOHN CALZADA DO              Jan 17, 2023 12:06

## 2023-01-17 NOTE — ANESTHESIA-GENERAL POST-OP
MAC


Patient Condition


Mental Status/LOC:  Same as Preop


Cardiovascular:  Satisfactory


Nausea/Vomiting:  Absent


Respiratory:  Satisfactory


Pain:  Controlled


Complications:  Absent





Post Op Complications


Complications


None





Follow Up Care/Instructions


Patient Instructions


None needed.





Anesthesiology Discharge Order


Discharge Order


Patient is doing well, no complaints, stable vital signs, no apparent adverse 

anesthesia problems.   


No complications reported per nursing.











ROSALIA GARCIA CRNA            Jan 17, 2023 13:08

## 2023-01-17 NOTE — DISCHARGE INST-SIMPLE/STANDARD
Discharge Inst-Standard


Patient Instructions/Follow Up


Plan of Care/Instructions/FU:  


5 years Anamaria, any issues be seen at that time.


Activity as Tolerated:  Yes


Discharge Diet:  Regular Diet (high fiber)











JOHN CALZADA DO              Jan 17, 2023 13:46

## 2023-01-18 NOTE — OPERATIVE REPORT
DATE OF SERVICE: 01/17/2023



PREOPERATIVE DIAGNOSIS:

Abnormal CT scan.



POSTOPERATIVE DIAGNOSIS:

Diverticulosis.



PROCEDURE:

Colonoscopy.



SURGEON:

John Conrad DO.



ANESTHESIA:

Per CRNA.



ESTIMATED BLOOD LOSS:

None.



COMPLICATIONS:

None.



INDICATIONS:

The patient is a 67-year-old male who had an incomplete colonoscopy.  He had a 

CT scan with rectal contrast, demonstrating thickening abnormality of the 

sigmoid colon.  He understood risks and benefits of procedure and wishes to 

proceed.  Consent was signed and in chart.



DESCRIPTION OF PROCEDURE:

The patient was taken to endoscopy suite and placed in left lateral recumbent 

position.  Timeout was performed.  Digital rectal exam was performed.  No 

palpable polyps, masses or ulcerations.  Scope was inserted in the rectum and 

advanced all the way to the cecum with minimal difficulty. The patient had to be

slightly repositioned at times, but was able to get the pediatric scope advanced

all the way to the cecum.  Prep was adequate.  Scope was then slowly retracted 

back.  No polyps, masses, or ulcerations of the cecum, ascending, transverse, 

descending and sigmoid colon.  Sigmoid colon had diverticulosis.  Once in the 

rectum, scope was retroflexed noting no other pathology.  Scope was returned to 

normal position slowly withdrawn until completely removed.  The patient 

tolerated the procedure well without any complications and taken to recovery 

room in stable condition.



RECOMMENDATIONS:

The patient is 67 years old and with history of polyps as well, we will consider

repeat colonoscopy in 5 years depending upon risks versus benefits.  He could 

also opt not to have any more colonoscopies due to his age.  The patient will 

follow up on an as needed basis.





Job ID: [id number]

DocumentID: [id number]

Dictated Date: 01/17/2023 20:55:09

Transcription Date: 01/18/2023 04:12:00

Dictated By: JOHN CONRAD DO

## 2023-08-31 ENCOUNTER — HOSPITAL ENCOUNTER (OUTPATIENT)
Dept: HOSPITAL 75 - RAD FS | Age: 68
End: 2023-08-31
Attending: INTERNAL MEDICINE
Payer: MEDICARE

## 2023-08-31 DIAGNOSIS — J85.2: ICD-10-CM

## 2023-08-31 DIAGNOSIS — A15.0: ICD-10-CM

## 2023-08-31 DIAGNOSIS — Z71.2: ICD-10-CM

## 2023-08-31 DIAGNOSIS — J98.4: ICD-10-CM

## 2023-08-31 DIAGNOSIS — J43.9: Primary | ICD-10-CM

## 2023-08-31 PROCEDURE — 71250 CT THORAX DX C-: CPT

## 2023-08-31 NOTE — DIAGNOSTIC IMAGING REPORT
EXAMINATION: CT chest without contrast.



TECHNIQUE: Multiple contiguous axial images were obtained through

the chest without the use of intravenous contrast. All CT scans

use one or more of the following dose optimizing techniques:

automated exposure control, MA and/or KvP adjustment based on

patient size and exam type or iterative reconstruction. 



HISTORY: Lung abscess



COMPARISON: 10/12/2022



FINDINGS: 



Thyroid: The thyroid is normal.



Mediastinum: Heart size is normal without significant pericardial

effusion. Calcifications of the aorta and coronary vessels.

Thoracic aorta is normal in caliber. No suspicious

lymphadenopathy.



Lungs and airways: There are background emphysematous changes in

the lungs. Prominent scarring is seen within the lung apices.

There is a redemonstrated thick-walled cyst or bulla within the

left lung apex which is unchanged from prior exam. There are

redemonstrated scattered reticulonodular opacities seen

throughout the lungs most prominent within the lingula. These

have increased within the upper lobes of the lung, specifically

within the left lower lobe. No pleural effusion or pneumothorax.

A few small filling defects seen within the airways which may

represent mucous plugging.



Upper abdomen: The subphrenic structures are normal. 



Musculoskeletal: No suspicious osseous lesion or compression

fracture.



IMPRESSION:



1. Overall increase in reticulonodular opacities in the lungs

concerning for progression of atypical pneumonia. Specifically

the medial left lower lobe nodular density seen on prior exam has

nearly completely resolved favoring atypical pneumonia.



2. Redemonstrated emphysematous changes and scarring within the

lung apices, unchanged from prior exam. Stable thick-walled

cystic spaces or cavitation within the left lung apex without

significant fluid component.



Dictated by: 



  Dictated on workstation # [id number]